# Patient Record
Sex: FEMALE | Race: WHITE | URBAN - METROPOLITAN AREA
[De-identification: names, ages, dates, MRNs, and addresses within clinical notes are randomized per-mention and may not be internally consistent; named-entity substitution may affect disease eponyms.]

---

## 2019-08-08 ENCOUNTER — OFFICE VISIT (OUTPATIENT)
Dept: URBAN - METROPOLITAN AREA CLINIC 88 | Facility: CLINIC | Age: 29
End: 2019-08-08
Payer: MEDICARE

## 2019-08-08 DIAGNOSIS — H53.40 VISUAL FIELD DEFECT: Primary | ICD-10-CM

## 2019-08-08 DIAGNOSIS — H52.13 MYOPIA, BILATERAL: ICD-10-CM

## 2019-08-08 PROCEDURE — 92082 INTERMEDIATE VISUAL FIELD XM: CPT | Performed by: OPHTHALMOLOGY

## 2019-08-08 PROCEDURE — 92004 COMPRE OPH EXAM NEW PT 1/>: CPT | Performed by: OPHTHALMOLOGY

## 2019-08-08 ASSESSMENT — KERATOMETRY
OS: 42.25
OD: 42.25

## 2019-08-08 ASSESSMENT — INTRAOCULAR PRESSURE
OS: 17
OD: 17

## 2019-08-08 NOTE — IMPRESSION/PLAN
Impression: Visual field defect: H53.40. Plan: Discussed diagnosis in detail with patient. I would recommend either a MRI of Brain and Pituitary area with/without contrast and she may need Neurology consult.